# Patient Record
Sex: FEMALE | Race: WHITE
[De-identification: names, ages, dates, MRNs, and addresses within clinical notes are randomized per-mention and may not be internally consistent; named-entity substitution may affect disease eponyms.]

---

## 2019-01-21 LAB
ANION GAP SERPL CALC-SCNC: 13.6 MMOL/L (ref 8–16)
BUN SERPL-MCNC: 14 MG/DL (ref 7–26)
BUN/CREAT SERPL: 19 (ref 6–25)
CALCIUM SERPL-MCNC: 9.1 MG/DL (ref 8.4–10.2)
CHLORIDE SERPL-SCNC: 99 MMOL/L (ref 98–107)
CO2 SERPL-SCNC: 28 MMOL/L (ref 22–29)
EGFRCR SERPLBLD CKD-EPI 2021: > 60 ML/MIN (ref 60–?)
GLUCOSE SERPLBLD-MCNC: 84 MG/DL (ref 74–118)
POTASSIUM SERPL-SCNC: 3.6 MMOL/L (ref 3.5–5.1)
SODIUM SERPL-SCNC: 137 MMOL/L (ref 136–145)

## 2019-01-23 ENCOUNTER — HOSPITAL ENCOUNTER (OUTPATIENT)
Dept: HOSPITAL 88 - OR | Age: 49
Discharge: HOME | End: 2019-01-23
Attending: UROLOGY
Payer: COMMERCIAL

## 2019-01-23 VITALS — DIASTOLIC BLOOD PRESSURE: 80 MMHG | SYSTOLIC BLOOD PRESSURE: 159 MMHG

## 2019-01-23 DIAGNOSIS — Z01.810: ICD-10-CM

## 2019-01-23 DIAGNOSIS — R00.1: ICD-10-CM

## 2019-01-23 DIAGNOSIS — Z01.812: ICD-10-CM

## 2019-01-23 DIAGNOSIS — N39.0: ICD-10-CM

## 2019-01-23 DIAGNOSIS — N20.1: Primary | ICD-10-CM

## 2019-01-23 DIAGNOSIS — I10: ICD-10-CM

## 2019-01-23 DIAGNOSIS — E78.00: ICD-10-CM

## 2019-01-23 DIAGNOSIS — N13.30: ICD-10-CM

## 2019-01-23 DIAGNOSIS — Z46.6: ICD-10-CM

## 2019-01-23 DIAGNOSIS — Z88.2: ICD-10-CM

## 2019-01-23 DIAGNOSIS — F41.9: ICD-10-CM

## 2019-01-23 PROCEDURE — 88300 SURGICAL PATH GROSS: CPT

## 2019-01-23 PROCEDURE — 36415 COLL VENOUS BLD VENIPUNCTURE: CPT

## 2019-01-23 PROCEDURE — 52356 CYSTO/URETERO W/LITHOTRIPSY: CPT

## 2019-01-23 PROCEDURE — 76000 FLUOROSCOPY <1 HR PHYS/QHP: CPT

## 2019-01-23 PROCEDURE — 93005 ELECTROCARDIOGRAM TRACING: CPT

## 2019-01-23 PROCEDURE — 80048 BASIC METABOLIC PNL TOTAL CA: CPT

## 2019-01-23 NOTE — XMS REPORT
Patient Summary Document

                             Created on: 2019



RETA CINDY

External Reference #: [id number]

: 1970

Sex: Female



Demographics







                          Address                   [address] [place] [address]  [id number]

 

                          Home Phone                [phone]

 

                          Preferred Language        Unknown

 

                          Marital Status            Unknown

 

                          Adventism Affiliation     Unknown

 

                          Race                      Unknown

 

                          Ethnic Group              Unknown





Author







                          Author                    [facility]

 

                          Address                   Unknown

 

                          Phone                     Unavailable







Care Team Providers







                    Care Team Member Name    Role                Phone

 

                          Unavailable               Unavailable







Problems

This patient has no known problems.



Allergies, Adverse Reactions, Alerts

This patient has no known allergies or adverse reactions.



Medications

This patient has no known medications.

## 2019-01-28 NOTE — OPERATIVE REPORT
DATE OF PROCEDURE:  January 23, 2019 

 

PREOPERATIVE DIAGNOSES 

1.  Foreign body, right ureter. 

2.  Indwelling right ureteral stent. 

3.  Right ureteral calculus.



POSTOPERATIVE DIAGNOSES 

1.  Foreign body, right ureter. 

2.  Indwelling right ureteral stent. 

3.  Right ureteral calculus.



PROCEDURES 

1. Cystourethroscopy with removal of right indwelling ureteral stent 

(entirely separate procedure for the diagnosis of right indwelling 

ureteral stent).

2. Right-sided ureteroscopy with laser lithotripsy (entirely separate 

procedure for the right ureteral calculi).

3. Cystourethroscopy with insertion of a right indwelling ureteral stent 

(entirely separate procedure for the diagnosis of right hydronephrosis).

4. Supervision of fluoroscopy.

5. Interpretation of retrograde pyelography.



ANESTHESIA:  General.



ESTIMATED BLOOD LOSS:  Minimal.



COMPLICATIONS:  None.



INDICATIONS FOR PROCEDURE:  Ms. Conn is a very pleasant, 48-year-old 

female who had undergone a previous ureteroscopy with stone manipulation by 

a doctor at [facility] as well as [facility].  

This doctor was Dr. Nazario Ramírez.  The patient has been having multiple stone 

formations in the interim.  She now presents with a stent for definitive 

ureteroscopy, laser lithotripsy and hopefully removal of basket fragments.  

The patient and I had a long discussion regarding the alternatives, risks 

and benefits, including doing nothing, shock-wave lithotripsy, 

ureteroscopy, percutaneous surgery, open surgery.  She voiced an 

understanding of the options, the alternatives, and the risks and benefits, 

and she elected to undergo ureteroscopic attempt of this. 



PROCEDURE IN DETAIL:  After informed consent was obtained, the patient was 

taken to the operative suite and placed supine on the table and underwent 

general anesthesia by the anesthesia service.  She was placed in the dorsal 

lithotomy position.  She was sterilely prepped and draped in the standard 

fashion for cystoscopy.  A 22.5-Guamanian cystoscope was inserted per urethra. 

 A normal urethra was noted.  Panendoscopy of the bladder revealed no 

tumors and no stones.  A stent was seen extruding from the right ureteral 

orifice.  This was grasped.  It was removed intact. A guidewire was 

inserted.  The ureteroscope was advanced to the level of the basket 

fragments and stones, best seen in photographic image #2.  Utilizing a 

365-micron laser fiber, all calculi were dusted and turned into fragments.  

Image #1 shows the tip in the edge of the basket.  Image #3 shows several 

of the embedded wire fragments in the ureter.  I was able to grasp and 

remove one of the fragments by pulling antegrade.  However, the other 

fragments could not be grasped with a grasper.  Upon pulling on them, it 

moved the entire ureter.  At this time, with the inability to remove any 

more of the fragments, we placed a ureteral stent and drained the bladder.  

The patient was awakened from anesthesia and transported to the recovery 

room in excellent condition.



I had an extensive discussion with the patient's  regarding the 

likely need for either excision of the ureteral segment with primary 

anastomosis versus possible Boari flap/psoas hitch 

versus possible need for ileal ureter.  [facility], Dr. Reeder, for robotic procedure. 









DD:  01/28/2019 08:45

DT:  01/28/2019 10:16

Job#:  [id number]

## 2023-10-13 ENCOUNTER — APPOINTMENT (RX ONLY)
Dept: URBAN - METROPOLITAN AREA CLINIC 120 | Facility: CLINIC | Age: 53
Setting detail: DERMATOLOGY
End: 2023-10-13

## 2023-10-13 DIAGNOSIS — L82.1 OTHER SEBORRHEIC KERATOSIS: ICD-10-CM

## 2023-10-13 DIAGNOSIS — L90.5 SCAR CONDITIONS AND FIBROSIS OF SKIN: ICD-10-CM

## 2023-10-13 DIAGNOSIS — D18.0 HEMANGIOMA: ICD-10-CM

## 2023-10-13 DIAGNOSIS — L82.0 INFLAMED SEBORRHEIC KERATOSIS: ICD-10-CM

## 2023-10-13 DIAGNOSIS — L57.0 ACTINIC KERATOSIS: ICD-10-CM

## 2023-10-13 PROBLEM — D18.01 HEMANGIOMA OF SKIN AND SUBCUTANEOUS TISSUE: Status: ACTIVE | Noted: 2023-10-13

## 2023-10-13 PROCEDURE — 17110 DESTRUCTION B9 LES UP TO 14: CPT

## 2023-10-13 PROCEDURE — 17000 DESTRUCT PREMALG LESION: CPT | Mod: 59

## 2023-10-13 PROCEDURE — ? COUNSELING

## 2023-10-13 PROCEDURE — 99203 OFFICE O/P NEW LOW 30 MIN: CPT | Mod: 25

## 2023-10-13 PROCEDURE — ? LIQUID NITROGEN

## 2023-10-13 PROCEDURE — ? TREATMENT REGIMEN

## 2023-10-13 ASSESSMENT — LOCATION DETAILED DESCRIPTION DERM
LOCATION DETAILED: LEFT FOREHEAD
LOCATION DETAILED: LEFT SUPERIOR MEDIAL UPPER BACK
LOCATION DETAILED: LEFT CENTRAL LATERAL NECK
LOCATION DETAILED: NASAL ROOT

## 2023-10-13 ASSESSMENT — PAIN INTENSITY VAS
HOW INTENSE IS YOUR PAIN 0 BEING NO PAIN, 10 BEING THE MOST SEVERE PAIN POSSIBLE?: 2/10 PAIN
HOW INTENSE IS YOUR PAIN 0 BEING NO PAIN, 10 BEING THE MOST SEVERE PAIN POSSIBLE?: NO PAIN

## 2023-10-13 ASSESSMENT — LOCATION ZONE DERM
LOCATION ZONE: FACE
LOCATION ZONE: NOSE
LOCATION ZONE: NECK
LOCATION ZONE: TRUNK

## 2023-10-13 ASSESSMENT — LOCATION SIMPLE DESCRIPTION DERM
LOCATION SIMPLE: NOSE
LOCATION SIMPLE: NECK
LOCATION SIMPLE: LEFT FOREHEAD
LOCATION SIMPLE: LEFT UPPER BACK

## 2023-10-13 NOTE — PROCEDURE: LIQUID NITROGEN
Render Note In Bullet Format When Appropriate: No
Medical Necessity Clause: This procedure was medically necessary because the lesions that were treated were:
Show Spray Paint Technique Variable?: Yes
Detail Level: Detailed
Post-Care Instructions: I reviewed with the patient in detail post-care instructions. Patient is to wear sunprotection, and avoid picking at any of the treated lesions. Pt may apply Vaseline to crusted or scabbing areas.
Spray Paint Text: The liquid nitrogen was applied to the skin utilizing a spray paint frosting technique.
Consent: The patient's consent was obtained including but not limited to risks of crusting, scabbing, blistering, scarring, darker or lighter pigmentary change, recurrence, incomplete removal and infection.
Medical Necessity Information: It is in your best interest to select a reason for this procedure from the list below. All of these items fulfill various CMS LCD requirements except the new and changing color options.
Number Of Freeze-Thaw Cycles: 1 freeze-thaw cycle
Duration Of Freeze Thaw-Cycle (Seconds): 0